# Patient Record
Sex: FEMALE | Race: WHITE | Employment: FULL TIME | URBAN - METROPOLITAN AREA
[De-identification: names, ages, dates, MRNs, and addresses within clinical notes are randomized per-mention and may not be internally consistent; named-entity substitution may affect disease eponyms.]

---

## 2022-06-24 RX ORDER — ATOMOXETINE 40 MG/1
40 CAPSULE ORAL DAILY
COMMUNITY
Start: 2022-04-06

## 2022-06-24 NOTE — PROGRESS NOTES
Patient verified name and . Order for consent NOT found; patient verifies procedure. Type 2 surgery, phone assessment complete. Orders not received. Labs per surgeon: no orders received   Labs per anesthesia protocol: Hgb DOS; order signed and held in EHR. Patient answered medical/surgical history questions at their best of ability. All prior to admission medications documented in [facility]. Patient instructed to take the following medications the day of surgery according to anesthesia guidelines with a small sip of water: Strattera. On the day before surgery please take Acetaminophen 1000mg in the morning and then again before bed. You may substitute for Tylenol 650 mg. Hold all vitamins 7 days prior to surgery and NSAIDS 5 days prior to surgery. Patient instructed on the following:    > Arrive at [address] [facility], time of arrival to be called the day before by 1700  > NPO after midnight, unless otherwise indicated, including gum, mints, and ice chips  > Responsible adult must drive patient to the hospital, stay during surgery, and patient will need supervision 24 hours after anesthesia  > Use anti-bacterial soap in shower the night before surgery and on the morning of surgery  > All piercings must be removed prior to arrival.    > Leave all valuables (money and jewelry) at home but bring insurance card and ID on DOS.   > You may be required to pay a deductible or co-pay on the day of your procedure. You can pre-pay by calling [phone] if your surgery is at the [facility] or [phone] if your surgery is at the [facility]. > Do not wear make-up, nail polish, lotions, cologne, perfumes, powders, or oil on skin. Artificial nails are not permitted.

## 2022-06-27 ENCOUNTER — ANESTHESIA EVENT (OUTPATIENT)
Dept: SURGERY | Age: 27
End: 2022-06-27
Payer: COMMERCIAL

## 2022-06-28 ENCOUNTER — ANESTHESIA (OUTPATIENT)
Dept: SURGERY | Age: 27
End: 2022-06-28
Payer: COMMERCIAL

## 2022-06-28 ENCOUNTER — HOSPITAL ENCOUNTER (OUTPATIENT)
Age: 27
Discharge: HOME | End: 2022-06-28
Attending: OBSTETRICS & GYNECOLOGY | Admitting: OBSTETRICS & GYNECOLOGY
Payer: COMMERCIAL

## 2022-06-28 VITALS
TEMPERATURE: 97.6 F | WEIGHT: 142.9 LBS | SYSTOLIC BLOOD PRESSURE: 108 MMHG | HEIGHT: 64 IN | HEART RATE: 72 BPM | DIASTOLIC BLOOD PRESSURE: 76 MMHG | RESPIRATION RATE: 18 BRPM | OXYGEN SATURATION: 99 % | BODY MASS INDEX: 24.4 KG/M2

## 2022-06-28 DIAGNOSIS — G89.18 POST-OP PAIN: Primary | ICD-10-CM

## 2022-06-28 PROBLEM — N84.1 CERVICAL POLYP: Status: ACTIVE | Noted: 2022-06-28

## 2022-06-28 PROBLEM — N84.1 CERVICAL POLYP: Status: RESOLVED | Noted: 2022-06-28 | Resolved: 2022-06-28

## 2022-06-28 PROBLEM — R10.2 PELVIC AND PERINEAL PAIN: Status: ACTIVE | Noted: 2022-06-28

## 2022-06-28 PROBLEM — N93.9 ABNORMAL UTERINE BLEEDING: Status: ACTIVE | Noted: 2022-06-28

## 2022-06-28 LAB
HCG UR QL: NEGATIVE
HGB BLD-MCNC: 13 G/DL (ref 11.7–15.4)

## 2022-06-28 PROCEDURE — 6360000002 HC RX W HCPCS: Performed by: OBSTETRICS & GYNECOLOGY

## 2022-06-28 PROCEDURE — 85018 HEMOGLOBIN: CPT

## 2022-06-28 PROCEDURE — 7100000010 HC PHASE II RECOVERY - FIRST 15 MIN: Performed by: OBSTETRICS & GYNECOLOGY

## 2022-06-28 PROCEDURE — 3700000000 HC ANESTHESIA ATTENDED CARE: Performed by: OBSTETRICS & GYNECOLOGY

## 2022-06-28 PROCEDURE — 6370000000 HC RX 637 (ALT 250 FOR IP): Performed by: ANESTHESIOLOGY

## 2022-06-28 PROCEDURE — 6360000002 HC RX W HCPCS: Performed by: ANESTHESIOLOGY

## 2022-06-28 PROCEDURE — 3600000014 HC SURGERY LEVEL 4 ADDTL 15MIN: Performed by: OBSTETRICS & GYNECOLOGY

## 2022-06-28 PROCEDURE — 3600000004 HC SURGERY LEVEL 4 BASE: Performed by: OBSTETRICS & GYNECOLOGY

## 2022-06-28 PROCEDURE — 2580000003 HC RX 258: Performed by: ANESTHESIOLOGY

## 2022-06-28 PROCEDURE — 2500000003 HC RX 250 WO HCPCS: Performed by: ANESTHESIOLOGY

## 2022-06-28 PROCEDURE — 2500000003 HC RX 250 WO HCPCS: Performed by: NURSE ANESTHETIST, CERTIFIED REGISTERED

## 2022-06-28 PROCEDURE — 3700000001 HC ADD 15 MINUTES (ANESTHESIA): Performed by: OBSTETRICS & GYNECOLOGY

## 2022-06-28 PROCEDURE — 2709999900 HC NON-CHARGEABLE SUPPLY: Performed by: OBSTETRICS & GYNECOLOGY

## 2022-06-28 PROCEDURE — 7100000000 HC PACU RECOVERY - FIRST 15 MIN: Performed by: OBSTETRICS & GYNECOLOGY

## 2022-06-28 PROCEDURE — 88305 TISSUE EXAM BY PATHOLOGIST: CPT

## 2022-06-28 PROCEDURE — 6360000002 HC RX W HCPCS: Performed by: NURSE ANESTHETIST, CERTIFIED REGISTERED

## 2022-06-28 PROCEDURE — 7100000001 HC PACU RECOVERY - ADDTL 15 MIN: Performed by: OBSTETRICS & GYNECOLOGY

## 2022-06-28 PROCEDURE — 2500000003 HC RX 250 WO HCPCS: Performed by: OBSTETRICS & GYNECOLOGY

## 2022-06-28 PROCEDURE — 2580000003 HC RX 258: Performed by: OBSTETRICS & GYNECOLOGY

## 2022-06-28 PROCEDURE — 81025 URINE PREGNANCY TEST: CPT

## 2022-06-28 RX ORDER — SODIUM CHLORIDE 0.9 % (FLUSH) 0.9 %
5-40 SYRINGE (ML) INJECTION PRN
Status: DISCONTINUED | OUTPATIENT
Start: 2022-06-28 | End: 2022-06-28 | Stop reason: HOSPADM

## 2022-06-28 RX ORDER — HYDROMORPHONE HYDROCHLORIDE 1 MG/ML
0.25 INJECTION, SOLUTION INTRAMUSCULAR; INTRAVENOUS; SUBCUTANEOUS EVERY 5 MIN PRN
Status: DISCONTINUED | OUTPATIENT
Start: 2022-06-28 | End: 2022-06-28 | Stop reason: HOSPADM

## 2022-06-28 RX ORDER — PROPOFOL 10 MG/ML
INJECTION, EMULSION INTRAVENOUS PRN
Status: DISCONTINUED | OUTPATIENT
Start: 2022-06-28 | End: 2022-06-28 | Stop reason: SDUPTHER

## 2022-06-28 RX ORDER — OXYCODONE HYDROCHLORIDE 5 MG/1
10 TABLET ORAL PRN
Status: COMPLETED | OUTPATIENT
Start: 2022-06-28 | End: 2022-06-28

## 2022-06-28 RX ORDER — SODIUM CHLORIDE 9 MG/ML
INJECTION, SOLUTION INTRAVENOUS PRN
Status: DISCONTINUED | OUTPATIENT
Start: 2022-06-28 | End: 2022-06-28 | Stop reason: HOSPADM

## 2022-06-28 RX ORDER — BUPIVACAINE HYDROCHLORIDE 5 MG/ML
INJECTION, SOLUTION EPIDURAL; INTRACAUDAL PRN
Status: DISCONTINUED | OUTPATIENT
Start: 2022-06-28 | End: 2022-06-28 | Stop reason: ALTCHOICE

## 2022-06-28 RX ORDER — FENTANYL CITRATE 50 UG/ML
INJECTION, SOLUTION INTRAMUSCULAR; INTRAVENOUS PRN
Status: DISCONTINUED | OUTPATIENT
Start: 2022-06-28 | End: 2022-06-28 | Stop reason: SDUPTHER

## 2022-06-28 RX ORDER — SODIUM CHLORIDE 0.9 % (FLUSH) 0.9 %
5-40 SYRINGE (ML) INJECTION EVERY 12 HOURS SCHEDULED
Status: DISCONTINUED | OUTPATIENT
Start: 2022-06-28 | End: 2022-06-28

## 2022-06-28 RX ORDER — MIDAZOLAM HYDROCHLORIDE 2 MG/2ML
2 INJECTION, SOLUTION INTRAMUSCULAR; INTRAVENOUS
Status: COMPLETED | OUTPATIENT
Start: 2022-06-28 | End: 2022-06-28

## 2022-06-28 RX ORDER — SODIUM CHLORIDE, SODIUM LACTATE, POTASSIUM CHLORIDE, CALCIUM CHLORIDE 600; 310; 30; 20 MG/100ML; MG/100ML; MG/100ML; MG/100ML
INJECTION, SOLUTION INTRAVENOUS CONTINUOUS
Status: DISCONTINUED | OUTPATIENT
Start: 2022-06-28 | End: 2022-06-28 | Stop reason: HOSPADM

## 2022-06-28 RX ORDER — ROCURONIUM BROMIDE 10 MG/ML
INJECTION, SOLUTION INTRAVENOUS PRN
Status: DISCONTINUED | OUTPATIENT
Start: 2022-06-28 | End: 2022-06-28 | Stop reason: SDUPTHER

## 2022-06-28 RX ORDER — ONDANSETRON 2 MG/ML
4 INJECTION INTRAMUSCULAR; INTRAVENOUS
Status: DISCONTINUED | OUTPATIENT
Start: 2022-06-28 | End: 2022-06-28 | Stop reason: HOSPADM

## 2022-06-28 RX ORDER — SODIUM CHLORIDE 0.9 % (FLUSH) 0.9 %
5-40 SYRINGE (ML) INJECTION PRN
Status: DISCONTINUED | OUTPATIENT
Start: 2022-06-28 | End: 2022-06-28

## 2022-06-28 RX ORDER — HYDROCODONE BITARTRATE AND ACETAMINOPHEN 5; 325 MG/1; MG/1
1 TABLET ORAL EVERY 6 HOURS PRN
Qty: 12 TABLET | Refills: 0 | Status: SHIPPED | OUTPATIENT
Start: 2022-06-28 | End: 2022-07-01

## 2022-06-28 RX ORDER — LIDOCAINE HYDROCHLORIDE 10 MG/ML
1 INJECTION, SOLUTION INFILTRATION; PERINEURAL
Status: COMPLETED | OUTPATIENT
Start: 2022-06-28 | End: 2022-06-28

## 2022-06-28 RX ORDER — SODIUM CHLORIDE 0.9 % (FLUSH) 0.9 %
5-40 SYRINGE (ML) INJECTION EVERY 12 HOURS SCHEDULED
Status: DISCONTINUED | OUTPATIENT
Start: 2022-06-28 | End: 2022-06-28 | Stop reason: HOSPADM

## 2022-06-28 RX ORDER — OXYCODONE HYDROCHLORIDE 5 MG/1
5 TABLET ORAL PRN
Status: COMPLETED | OUTPATIENT
Start: 2022-06-28 | End: 2022-06-28

## 2022-06-28 RX ORDER — HYDROMORPHONE HYDROCHLORIDE 1 MG/ML
0.5 INJECTION, SOLUTION INTRAMUSCULAR; INTRAVENOUS; SUBCUTANEOUS EVERY 5 MIN PRN
Status: DISCONTINUED | OUTPATIENT
Start: 2022-06-28 | End: 2022-06-28 | Stop reason: HOSPADM

## 2022-06-28 RX ORDER — SODIUM CHLORIDE 9 MG/ML
INJECTION, SOLUTION INTRAVENOUS PRN
Status: DISCONTINUED | OUTPATIENT
Start: 2022-06-28 | End: 2022-06-28

## 2022-06-28 RX ORDER — ACETAMINOPHEN 500 MG
1000 TABLET ORAL ONCE
Status: COMPLETED | OUTPATIENT
Start: 2022-06-28 | End: 2022-06-28

## 2022-06-28 RX ADMIN — LIDOCAINE HYDROCHLORIDE 60 MG: 10 INJECTION, SOLUTION INFILTRATION; PERINEURAL at 07:25

## 2022-06-28 RX ADMIN — FENTANYL CITRATE 100 MCG: 50 INJECTION INTRAMUSCULAR; INTRAVENOUS at 07:24

## 2022-06-28 RX ADMIN — HYDROMORPHONE HYDROCHLORIDE 0.5 MG: 1 INJECTION, SOLUTION INTRAMUSCULAR; INTRAVENOUS; SUBCUTANEOUS at 08:45

## 2022-06-28 RX ADMIN — PROPOFOL 200 MG: 10 INJECTION, EMULSION INTRAVENOUS at 07:24

## 2022-06-28 RX ADMIN — SODIUM CHLORIDE, SODIUM LACTATE, POTASSIUM CHLORIDE, AND CALCIUM CHLORIDE: 600; 310; 30; 20 INJECTION, SOLUTION INTRAVENOUS at 06:31

## 2022-06-28 RX ADMIN — ROCURONIUM BROMIDE 10 MG: 50 INJECTION, SOLUTION INTRAVENOUS at 07:58

## 2022-06-28 RX ADMIN — SUGAMMADEX 200 MG: 100 INJECTION, SOLUTION INTRAVENOUS at 08:31

## 2022-06-28 RX ADMIN — MIDAZOLAM HYDROCHLORIDE 2 MG: 1 INJECTION, SOLUTION INTRAMUSCULAR; INTRAVENOUS at 07:08

## 2022-06-28 RX ADMIN — PHENYLEPHRINE HYDROCHLORIDE 50 MCG: 0.1 INJECTION, SOLUTION INTRAVENOUS at 08:15

## 2022-06-28 RX ADMIN — PHENYLEPHRINE HYDROCHLORIDE 50 MCG: 0.1 INJECTION, SOLUTION INTRAVENOUS at 08:06

## 2022-06-28 RX ADMIN — ACETAMINOPHEN 1000 MG: 500 TABLET, FILM COATED ORAL at 06:31

## 2022-06-28 RX ADMIN — ROCURONIUM BROMIDE 40 MG: 50 INJECTION, SOLUTION INTRAVENOUS at 07:25

## 2022-06-28 RX ADMIN — Medication 2000 MG: at 07:22

## 2022-06-28 RX ADMIN — PHENYLEPHRINE HYDROCHLORIDE 50 MCG: 0.1 INJECTION, SOLUTION INTRAVENOUS at 08:22

## 2022-06-28 RX ADMIN — OXYCODONE 10 MG: 5 TABLET ORAL at 09:05

## 2022-06-28 ASSESSMENT — PAIN DESCRIPTION - LOCATION: LOCATION: ABDOMEN

## 2022-06-28 ASSESSMENT — PAIN DESCRIPTION - DESCRIPTORS: DESCRIPTORS: SORE

## 2022-06-28 ASSESSMENT — PAIN - FUNCTIONAL ASSESSMENT: PAIN_FUNCTIONAL_ASSESSMENT: 0-10

## 2022-06-28 ASSESSMENT — PAIN SCALES - GENERAL: PAINLEVEL_OUTOF10: 7

## 2022-06-28 NOTE — ANESTHESIA PRE PROCEDURE
Department of Anesthesiology  Preprocedure Note       Name:  Cesia Christy   Age:  32 y.o.  :  1995                                          MRN:  [id number]         Date:  2022      Surgeon: Chloe Dodson):  MD Luther Montiel MD    Procedure: Procedure(s): HYSTEROSCOPY POSS DILATATION AND CURETTAGE/ MYOSURE  LAPAROSCOPY DIAGNOSTIC POSS LASER ABLATION OF ENDOMETRIOSIS/ POSS CHROMOTUBATION    Medications prior to admission:   Prior to Admission medications    Medication Sig Start Date End Date Taking? Authorizing Provider   atomoxetine (STRATTERA) 40 MG capsule Take 40 mg by mouth daily 22  Yes Historical Provider, MD       Current medications:    Current Facility-Administered Medications   Medication Dose Route Frequency Provider Last Rate Last Admin    lidocaine 1 % injection 1 mL  1 mL IntraDERmal Once PRN L Celio Meraz MD        lactated ringers infusion   IntraVENous Continuous L Celio Meraz MD 75 mL/hr at 22 0631 New Bag at 22 0631    sodium chloride flush 0.9 % injection 5-40 mL  5-40 mL IntraVENous 2 times per day L Celio Meraz MD        sodium chloride flush 0.9 % injection 5-40 mL  5-40 mL IntraVENous PRN L Celio Meraz MD        0.9 % sodium chloride infusion   IntraVENous PRN L Celio Meraz MD        midazolam PF (VERSED) injection 2 mg  2 mg IntraVENous Once PRN L Celio Meraz MD           Allergies:  No Known Allergies    Problem List:  There is no problem list on file for this patient. Past Medical History:  History reviewed. No pertinent past medical history.     Past Surgical History:        Procedure Laterality Date    COLONOSCOPY         Social History:    Social History     Tobacco Use    Smoking status: Never Smoker    Smokeless tobacco: Never Used   Substance Use Topics    Alcohol use: Yes     Comment: rare                                Counseling given: Not Answered      Vital Signs (Current):   Vitals:    22 3703 06/28/22 0620   BP:  117/76   Pulse:  94   Resp:  16   Temp:  98.1 °F (36.7 °C)   TempSrc:  Temporal   SpO2:  99%   Weight: 140 lb (63.5 kg) 142 lb 14.4 oz (64.8 kg)   Height: 5' 3\" (1.6 m) 5' 3.5\" (1.613 m)                                              BP Readings from Last 3 Encounters:   06/28/22 117/76       NPO Status: Time of last liquid consumption: 2300                        Time of last solid consumption: 2300                        Date of last liquid consumption: 06/27/22                        Date of last solid food consumption: 06/27/22    BMI:   Wt Readings from Last 3 Encounters:   06/28/22 142 lb 14.4 oz (64.8 kg)     Body mass index is 24.92 kg/m². CBC:   Lab Results   Component Value Date    HGB 13.0 06/28/2022       CMP: No results found for: NA, K, CL, CO2, BUN, CREATININE, GFRAA, AGRATIO, LABGLOM, GLUCOSE, GLU, PROT, CALCIUM, BILITOT, ALKPHOS, AST, ALT    POC Tests: No results for input(s): POCGLU, POCNA, POCK, POCCL, POCBUN, POCHEMO, POCHCT in the last 72 hours.     Coags: No results found for: PROTIME, INR, APTT    HCG (If Applicable):   Lab Results   Component Value Date    PREGTESTUR Negative 06/28/2022        ABGs: No results found for: PHART, PO2ART, PNN9NAI, ZDT6ZDO, BEART, W6RDUIFM     Type & Screen (If Applicable):  No results found for: LABABO, LABRH    Drug/Infectious Status (If Applicable):  No results found for: HIV, HEPCAB    COVID-19 Screening (If Applicable): No results found for: COVID19        Anesthesia Evaluation  Patient summary reviewed and Nursing notes reviewed  Airway: Mallampati: I  TM distance: >3 FB   Neck ROM: full  Mouth opening: > = 3 FB   Dental: normal exam         Pulmonary:Negative Pulmonary ROS breath sounds clear to auscultation                             Cardiovascular:Negative CV ROS  Exercise tolerance: good (>4 METS),           Rhythm: regular  Rate: normal                    Neuro/Psych:   Negative Neuro/Psych ROS  (+) psychiatric history (ADHD): stable with treatment            GI/Hepatic/Renal: Neg GI/Hepatic/Renal ROS            Endo/Other: Negative Endo/Other ROS                    Abdominal:             Vascular: negative vascular ROS. Other Findings:           Anesthesia Plan      general     ASA 1       Induction: intravenous. Anesthetic plan and risks discussed with patient and mother.                         Angelic Still MD   6/28/2022

## 2022-06-28 NOTE — BRIEF OP NOTE
Brief Postoperative Note    Cynthia Walker  YOB: 1995  [id number]    Pre-operative Diagnosis:   1. Pelvic and perineal pain  2. Abnormal uterine bleeding, suspicion for cervical polyp    Post-operative Diagnoses:  1. Endometriosis, stage II  2. Normal appearing uterine cavity, no evidence of cervical polyp. Normal appearing tubal ostia. 3. Bilateral spill on chromotubation    Procedure:   1. Diagnostic hysteroscopy  2. Dilation and curettage  3. Diagnostic laparoscopy with ablation of endometriosis  4. Tubal chromotubation for fertility testing    Anesthesia: General    Surgeons/Assistants:   Surgeon: Dr. Savannah Sam. Wendy Ragland MD  Assistant: Dr. Alexis Pacheco. Rafaela Durham MD    Estimated Blood Loss: less than 50     Complications: None    Specimens: Was Obtained: endometrial curettings    Findings: Stage II endometriosis. Normal appearing uterine cavity with bilateral tubal ostia.  Bilateral spill on chromotubation    Electronically signed by Faustino Horn MD on 6/28/2022 at 8:45 AM

## 2022-06-28 NOTE — OP NOTE
Operative Note    Pre-operative Diagnoses:   Abnormal uterine bleeding, suspicion for cervical polyp  Chronic pelvic pain    Post-operative Diagnoses:   Same as above including normal appearing uterine cavity, bilateral tubal ostia. Stage II endometriosis  Bilateral spill on chromotubation    Surgeon: Netta Parra MD     Assistants: Catrachito Oliveira MD    Anesthesia: General endotracheal anesthesia      Procedure Details   The patient was seen in the Holding Room. The risks, benefits, complications, treatment options, and expected outcomes were discussed with the patient. The patient concurred with the proposed plan, giving informed consent. The patient was taken to the Operating Room, identified as Kellie Otto and the procedure verified as diagnostic hysteroscopy, possible polypectomy, dilation and curettage, and diagnostic laparoscopy. After induction of general anesthesia, the patient was placed in modified dorsal lithotomy position where she was prepped, draped, and catheterized in the normal, sterile fashion. Attention was first turned to the vagina for hysteroscopy to be performed. A Sutton catheter was then placed in to the urethra. A weighted speculum was placed in the posterior fornix and retractor used to retract the anterior vaginal wall. The anterior lip of the cervix was grasped with a tenaculum, and uterus sounded to 9 cm. The cervix was then serially dilated to [address] [place] to allow for hysteroscope to be introduced. The hysteroscope was introduced with uterine cavity distended with appropriate media. Inspection of the uterine cavity revealed normal appearing contour, and normal appearing bilateral tubal ostia. Careful inspection of the cervix did not reveal a cervical polyp, but there was noted to be some tissue adherent at level of internal os which could represent a previous polyp.  The hysteroscope was removed and a careful curettage was performed sharply with serrated currette with curettings passed off for pathologic evaluation. Polyp forceps were used to also remove some mucoid tissue as well to be passed off. There was no evidence of injury upon hysteroscope being reintroduced following curettage. A HUMI uterine manipulator was then introduced in to the uterine cavity. All instruments were then removed from the vagina and attention turned to the laparoscopic portion of the procedure. The infraumbilical tissue was grasped with Allis clamps, elevated, and injected with local anesthetic. A 5 mm vertical umbilical incision was made and then laparoscope along with trocar introduced in to the patient's abdomen. Once successful infra-abdominal placement was affirmed, CO2 gas was used to insufflate the patient's abdomen. There was no evidence of visceral or vascular injury upon entry. There was noted to be some redundant fat tissue directly under umbilicus, but not noted to be concerning for adhesions or any other viscera. Survey of the upper abdomen revealed no abnormal findings and appendix appeared to look normal as well. The patient was placed in Trendelenburg position. The uterus was elevated and inspection noted some areas concerning for endometriosis in posterior cul-de-sac. There was scarring present in right inguinal area along with punctations in peritoneum. Additionally, there was a spot directly near the left uterosacral that looked concerning for endometriosis as well as along right pelvic sidewall. A 5 mm port was then introduced in to the left lower quadrant. The bowel was gently swept away out of the pelvis using a blunt probe. The monopolar scissors were then introduced in to the pelvis and the uterine fundus elevated. The scissors were then used to coagulate the areas concerning for endometriosis successfully. Hemostasis was affirmed following coagulation. Attention was then turned to chromotubation for fertility testing.  Methylene blue dye was instilled through HUMI manipulator with inspection of fallopian tubes noting bilateral spill. The pelvic was then irrigated and all remaining fluid suctioned out. The procedure was then felt to be complete and all trocars removed from the abdomen as well as HUMI from uterus. The cervix was inspected and noted to be hemostatic. The incisions were then closed in a subcuticular fashion with 4-0 Monocryl with Dermabond overlying. The patient was then awakened from general anesthesia and transferred to the PACU in stable condition. Instrument, sponge, and needle counts were correct prior to abdominal closure and at the conclusion of the case. Dr. Jose C Heller assistance was required during the procedure to assist with visualization and dissection in order to safely carry out the laparoscopy as a suitable alternative assistant was not available. Estimated Blood Loss: 50 mL            Specimens: Endometrial curetting's    Complications:  None; patient tolerated the procedure well. Disposition: PACU - hemodynamically stable. Condition: stable    Attending Attestation: I was present and scrubbed for the entire procedure.

## 2022-06-28 NOTE — H&P
History & Physical    Name: Mary Grace Rasmussen MRN: [id number]  SSN: [id number]    YOB: 1995  Age: 32 y.o. Sex: female      Subjective:     Ms. Juanito Winkler is admitted for diagnostic laparoscopy, hysteroscopy, D&C, possible polypectomy and other indicated procedures. Originally presented with abnormal bleeding off and on as well as pelvic pain, mostly left, intermittently as well. Elected for trial of SANDRA's, however had continued bleeding and pain. She also had been having problems with constipation, and has undergone a GI work-up suspicious for Crohn's. She had an ultrasound done in January which was suspicious for a cervical polyp. Due to continued bleeding and pain, elected for removal of possible polyp, hysteroscopy, and laparoscopy to evaluate for GYN sources of pain. History reviewed. No pertinent past medical history. Past Surgical History:   Procedure Laterality Date    COLONOSCOPY       Social History     Occupational History    Not on file   Tobacco Use    Smoking status: Never Smoker    Smokeless tobacco: Never Used   Substance and Sexual Activity    Alcohol use: Yes     Comment: rare    Drug use: Not on file    Sexual activity: Not on file     History reviewed. No pertinent family history. No Known Allergies  Prior to Admission medications    Medication Sig Start Date End Date Taking? Authorizing Provider   atomoxetine (STRATTERA) 40 MG capsule Take 40 mg by mouth daily 4/6/22  Yes Historical Provider, MD        Review of Systems: Negative except as documented above.      Objective:     Vitals:  Vitals:    06/24/22 0852 06/28/22 0620   BP:  117/76   Pulse:  94   Resp:  16   Temp:  98.1 °F (36.7 °C)   TempSrc:  Temporal   SpO2:  99%   Weight: 140 lb (63.5 kg) 142 lb 14.4 oz (64.8 kg)   Height: 5' 3\" (1.6 m) 5' 3.5\" (1.613 m)        Physical Exam:   General: AAO x 3, NAD  Resp: Clear to auscultation bilaterally  CV: Regular rate and rhythm  Abd: Soft, non-tender, non-distended  Ext: No edema  Neuro: No focal deficits  Psych: Appropriate mood and affect      Prenatal Labs:   No results found for: ABORH, RUBELLAEXT, HBSAGEXT, HIVEXT, RPREXT, GONNOEXT, CHLAMEXT    Impression/Plan:     Active Problems:    Pelvic and perineal pain    Abnormal uterine bleeding    Cervical polyp  Resolved Problems:    * No resolved hospital problems. *       Plan: Admit for hysteroscopy, diagnostic laparoscopy, possible cervical polyp removal and other indicated procedures. R/B/A discussed with patient and consents signed. Aware of risk of visceral/vascular injury, damage to surrounding structures, risk of additional procedures. Will proceed to OR.

## 2022-06-28 NOTE — ANESTHESIA POSTPROCEDURE EVALUATION
Department of Anesthesiology  Postprocedure Note    Patient: Ai Terry  MRN: [id number]  YOB: 1995  Date of evaluation: 6/28/2022      Procedure Summary     Date: 06/28/22 Room / Location: [facility] [place] [place] MAIN OR 02 / [facility] [place] [place] MAIN OR    Anesthesia Start: 0714 Anesthesia Stop:     Procedures:       HYSTEROSCOPY POSS DILATATION AND CURETTAGE (N/A Vagina )      LAPAROSCOPY DIAGNOSTIC POSS LASER ABLATION OF ENDOMETRIOSIS/CHROMOTUBATION (N/A ) Diagnosis:       Vaginal hemorrhage      (Y878)      (R102)      (N406)      (C408)    Surgeons: Sridevi Plasencia MD Responsible Provider: Delmi Cuellar MD    Anesthesia Type: General ASA Status: 1          Anesthesia Type: General    Christos Phase I: Christos Score: 9    Christos Phase II:        Anesthesia Post Evaluation    Patient location during evaluation: PACU  Patient participation: complete - patient participated  Level of consciousness: awake and alert  Airway patency: patent  Nausea & Vomiting: no nausea and no vomiting  Complications: no  Cardiovascular status: hemodynamically stable  Respiratory status: acceptable  Hydration status: euvolemic  Comments: Blood pressure 108/75, pulse 95, temperature 97.6 °F (36.4 °C), temperature source Temporal, resp. rate 18, height 5' 3.5\" (1.613 m), weight 142 lb 14.4 oz (64.8 kg), SpO2 99 %.       Pt stable for discharge from PACU  Multimodal analgesia pain management approach

## (undated) DEVICE — GARMENT,MEDLINE,DVT,INT,CALF,MED, GEN2: Brand: MEDLINE

## (undated) DEVICE — TROCAR: Brand: KII FIOS FIRST ENTRY

## (undated) DEVICE — MINOR LITHOTOMY PACK: Brand: MEDLINE INDUSTRIES, INC.

## (undated) DEVICE — Z DUPLICATE USE 2431315 SET INSUF TBNG HI FLO W/ SMK EVAC FOR PNEUMOCLEAR

## (undated) DEVICE — PUMP SUC IRR TBNG L10FT W/ HNDPC ASSEMB STRYKEFLOW 2

## (undated) DEVICE — CYSTO/BLADDER IRRIGATION SET, REGULATING CLAMP

## (undated) DEVICE — SYRINGE MED 10ML LUERLOCK TIP W/O SFTY DISP

## (undated) DEVICE — PAD,NON-ADHERENT,3X8,STERILE,LF,1/PK: Brand: MEDLINE

## (undated) DEVICE — GLOVE SURG SZ 7 L12IN FNGR THK79MIL GRN LTX FREE

## (undated) DEVICE — SOLUTION IRRIG 1000ML 0.9% SOD CHL USP POUR PLAS BTL

## (undated) DEVICE — APPLICATOR MEDICATED 26 CC SOLUTION HI LT ORNG CHLORAPREP

## (undated) DEVICE — DRAPE,UNDERBUTTOCKS,PCH,STERILE: Brand: MEDLINE

## (undated) DEVICE — SUTURE MCRYL SZ 4-0 L18IN ABSRB UD L19MM PS-2 3/8 CIR PRIM Y496G

## (undated) DEVICE — GLOVE SURG SZ 65 L12IN FNGR THK79MIL GRN LTX FREE

## (undated) DEVICE — CARDINAL HEALTH FLEXIBLE LIGHT HANDLE COVER: Brand: CARDINAL HEALTH

## (undated) DEVICE — GLOVE SURG SZ 6 L12IN FNGR THK79MIL GRN LTX FREE

## (undated) DEVICE — SOLUTION IRRIG 3000ML 0.9% SOD CHL USP UROMATIC PLAS CONT

## (undated) DEVICE — LOGICUT SCISSOR LENGTH 320MM: Brand: LOGI - LAPAROSCOPIC INSTRUMENT SYSTEM

## (undated) DEVICE — PAD PT POS 36 IN SURGYPAD DISP

## (undated) DEVICE — SET ENDOSCP SEAL HYSTEROSCOPE RIG OUTFLO CHN DISP MYOSURE

## (undated) DEVICE — ADHESIVE SKIN CLSR 0.7ML TOP DERMBND ADV

## (undated) DEVICE — GYN LAPAROSCOPY: Brand: MEDLINE INDUSTRIES, INC.

## (undated) DEVICE — CANISTER, RIGID, 2000CC: Brand: MEDLINE INDUSTRIES, INC.

## (undated) DEVICE — SYRINGE MED 50ML LUERLOCK TIP

## (undated) DEVICE — TROCAR: Brand: KII® SLEEVE

## (undated) DEVICE — Z DUPLICATE USE 2847003 INJECTOR UTER